# Patient Record
Sex: FEMALE | Race: WHITE | Employment: FULL TIME | URBAN - METROPOLITAN AREA
[De-identification: names, ages, dates, MRNs, and addresses within clinical notes are randomized per-mention and may not be internally consistent; named-entity substitution may affect disease eponyms.]

---

## 2017-02-28 ENCOUNTER — APPOINTMENT (OUTPATIENT)
Dept: CT IMAGING | Age: 32
End: 2017-02-28
Attending: PHYSICIAN ASSISTANT
Payer: COMMERCIAL

## 2017-02-28 ENCOUNTER — HOSPITAL ENCOUNTER (OUTPATIENT)
Age: 32
Discharge: HOME | End: 2017-02-28
Payer: COMMERCIAL

## 2017-02-28 VITALS
DIASTOLIC BLOOD PRESSURE: 83 MMHG | HEIGHT: 62 IN | OXYGEN SATURATION: 100 % | WEIGHT: 155 LBS | TEMPERATURE: 99 F | RESPIRATION RATE: 16 BRPM | BODY MASS INDEX: 28.52 KG/M2 | SYSTOLIC BLOOD PRESSURE: 119 MMHG | HEART RATE: 79 BPM

## 2017-02-28 DIAGNOSIS — L72.3 SEBACEOUS CYST: Primary | ICD-10-CM

## 2017-02-28 LAB — POCT URINE PREGNANCY: NEGATIVE

## 2017-02-28 PROCEDURE — 81025 URINE PREGNANCY TEST: CPT | Performed by: PHYSICIAN ASSISTANT

## 2017-02-28 PROCEDURE — 99204 OFFICE O/P NEW MOD 45 MIN: CPT

## 2017-02-28 PROCEDURE — 70450 CT HEAD/BRAIN W/O DYE: CPT

## 2017-02-28 RX ORDER — CEPHALEXIN 500 MG/1
500 CAPSULE ORAL 4 TIMES DAILY
Qty: 28 CAPSULE | Refills: 0 | Status: SHIPPED | OUTPATIENT
Start: 2017-02-28 | End: 2017-03-07

## 2017-02-28 NOTE — ED PROVIDER NOTES
Patient Seen in: THE [facility] In [place]    History   Patient presents with:  Bump    Stated Complaint: bump on back neck/2 days    HPI    23-year-old female who comes in with a bump on the back of her head that she noticed 2 days ago it is Nose:  Nares symmetrical, minimal watery discharge; no sinus tenderness  Throat:  Lips, tongue, and mucosa are moist, pink, and intact; posterior pharynx without exudate or erythema. No trismus or stridor, uvula midline.    Neck:  Supple; symmetrical, tra symptoms is a small subcutaneous nodule. Differential considerations include small lymph node and sebaceous cyst. Continued clinical correlation recommended.     Dictated by: Jeevan Iglesias MD on 2/28/2017 at 16:06     Approved by: Jeevan Iglesias MD            MDM plan.

## 2020-11-03 ENCOUNTER — OFFICE VISIT (OUTPATIENT)
Dept: FAMILY MEDICINE CLINIC | Facility: CLINIC | Age: 35
End: 2020-11-03
Payer: COMMERCIAL

## 2020-11-03 VITALS
HEIGHT: 63 IN | BODY MASS INDEX: 28.35 KG/M2 | HEART RATE: 64 BPM | OXYGEN SATURATION: 98 % | RESPIRATION RATE: 16 BRPM | DIASTOLIC BLOOD PRESSURE: 84 MMHG | SYSTOLIC BLOOD PRESSURE: 114 MMHG | TEMPERATURE: 98 F | WEIGHT: 160 LBS

## 2020-11-03 DIAGNOSIS — Z20.822 EXPOSURE TO COVID-19 VIRUS: ICD-10-CM

## 2020-11-03 DIAGNOSIS — Z11.59 ENCOUNTER FOR SCREENING FOR OTHER VIRAL DISEASES: Primary | ICD-10-CM

## 2020-11-03 PROCEDURE — 3079F DIAST BP 80-89 MM HG: CPT | Performed by: NURSE PRACTITIONER

## 2020-11-03 PROCEDURE — 99202 OFFICE O/P NEW SF 15 MIN: CPT | Performed by: NURSE PRACTITIONER

## 2020-11-03 PROCEDURE — 3074F SYST BP LT 130 MM HG: CPT | Performed by: NURSE PRACTITIONER

## 2020-11-03 PROCEDURE — 3008F BODY MASS INDEX DOCD: CPT | Performed by: NURSE PRACTITIONER

## 2020-11-04 NOTE — PATIENT INSTRUCTIONS
Follow self isolation/quarantine instructions below. COVID results will be released to [employer]. We will call if you don't have a [employer] account.  Follow up with your primary care doctor if symptoms worsen or seek immediate care if you develop shortne Manage Your Health at Home      1. Stay home from work, school, and away from other public places. If you must go out, avoid using any kind of public transportation, ridesharing, or taxis. 2. Monitor your symptoms carefully.  If your symptoms get worse, c you have tested positive for COVID-19, you should remain under home isolation precautions following the below guidelines:  • At least 24 hours have passed since recovery defined as resolution of fever without the use of fever-reducing medications; and  · I diagnosed with the virus, please contact [facility] directly on their website: [email or web address]    Who is eligible to donate convalescent plasma?     Potential convalescent plasma donors must:    · Have had a confirmed

## 2020-11-04 NOTE — PROGRESS NOTES
CHIEF COMPLAINT:   No chief complaint on file. HPI:   Saul Gunderson is a 27year old female who presents with request for COVID testing. Reports exposure to sister on 10/29 and she later tested + for COVID.      Work is requesting her to have a [place] Pt is asymptomatic. PLAN:    COVID-19 testing ordered. Advised to self quarantine at home for 14 days from last known COVID exposure. Follow up with PCP if symptoms develop.      Advised if COVID test is negative, it does not guarantee pt is not inf * You had direct physical contact with the person (touched, hugged, or kissed them)  * You shared eating or drinking utensils  * They sneezed, coughed, or somehow got respiratory droplets on you    Patients with pending COVID-19 test results should follow If you are awaiting test results or are confirmed positive for COVID -19, and your symptoms worsen at home with symptoms such as: extreme weakness, difficult breathing, or unrelenting fevers greater than 100.4 degrees Fahrenheit, you should contact your he Diana Baker, in conjunction with [facility], is looking for patients who have recovered from COVID-19 and would be interested in donating plasma.     Convalescent plasma is a component of blood that, in people who have recovered from COVID-19, [email or web address]  https://www.cdc.gov/coronavirus/2019-ncov/

## (undated) NOTE — LETTER
[facility] IN [place]  [address] [address]  Dept: [phone]  Dept Fax: [phone]      February 28, 2017    Patient: Karen Melvin   Date of Visit: 2/28/2017       To Whom It May Concern:    Karen Melvin was seen and t

## (undated) NOTE — ED AVS SNAPSHOT
[facility] in [address]    [address] [place] [address]    Phone:  [phone]    Fax:  [phone]           Jackie Sheriff   MRN: [id number]    Department:  THE [facility] in [address] [place] [address],[address]   Date of Visit:  2/28/2017 counter probiotics. If you'd like, you can have 2 servings of yogurt/Kefir daily instead. Probiotics increase the good bacteria in your gut while the antibiotic fights the bad bacteria that is causing your infection.   The good bacteria in your gut act as does not uncover every injury or illness.  If you have been referred to a primary care or a specialist physician for a follow-up visit, please tell this physician (or your personal doctor if your instructions are to return to your personal doctor) about any Additional Information       We are concerned for your overall well being:    - If you are a smoker or have smoked in the last 12 months, we encourage you to explore options for quitting.     - If you have concerns related to behavioral health issues or th intracranial hemorrhage. No abnormal extra-axial fluid collection. The visualized paranasal sinuses and mastoid air cells are satisfactorily aerated. The patient place is a marker at the site of palpable abnormality and symptoms.  This is poster